# Patient Record
(demographics unavailable — no encounter records)

---

## 2025-01-13 NOTE — REVIEW OF SYSTEMS
[Feeling Fatigued] : feeling fatigued [Dyspnea on exertion] : dyspnea during exertion [Joint Pain] : joint pain [Negative] : Respiratory [Fever] : no fever [Headache] : no headache [Chills] : no chills [Blurry Vision] : no blurred vision [Chest Discomfort] : no chest discomfort [Lower Ext Edema] : no extremity edema [Palpitations] : no palpitations [Orthopnea] : no orthopnea [PND] : no PND [Abdominal Pain] : no abdominal pain [Nausea] : no nausea [Vomiting] : no vomiting [Heartburn] : no heartburn [Rash] : no rash [Itching] : no itching [Dizziness] : no dizziness [Tremor] : no tremor was seen [Numbness (Hypoesthesia)] : no numbness [Tingling (Paresthesia)] : no tingling [Confusion] : no confusion was observed [Anxiety] : no anxiety [Under Stress] : not under stress [Easy Bleeding] : no tendency for easy bleeding [Easy Bruising] : no tendency for easy bruising

## 2025-01-13 NOTE — HISTORY OF PRESENT ILLNESS
[FreeTextEntry1] : Mr. Husbands, Saleem is a 70-year-old male with hypertension, hypercholesterolemia, Atrial fibrillation, diabetes, severe non ischemic cardiomyopathy, s/p MV annuloplasty ring and s/p AICD comes for follow up visit. Denies any chest pains or palpitations. Chronic on and off fatigue and shortness of breath on exertion persists. AICD interrogation revealed episodes of A fib/ A flutter and was started on Eliquis by EP. No orthopnea or paroxysmal nocturnal dyspnea. Complaint to medications and diet. Follows up with PCP. Physically active as tolerated.

## 2025-01-13 NOTE — DISCUSSION/SUMMARY
[FreeTextEntry1] : In a summary Mr. Husbands, Saleem is a middle-aged male with Hypertension, Continue Carvedilol, Enalapril and 2 gm sodium diet. Hypercholesterolemia, continue Atorvastatin and low cholesterol diet. LDL goal less than 70 g/dL. Diabetes, on medications and low Carb diet. Severe cardiomyopathy, euvolemic. Continue Furosemide, Spironolactone and fluid restriction. Daily weights. exercise as tolerated. On Farxiga. A fib/ A flutter, on Eliquis. Follows up with EP. Gets [facility] checks at [facility]. Follow up in 4 months. Aware of his prognosis.

## 2025-06-23 NOTE — DISCUSSION/SUMMARY
[FreeTextEntry1] : In a summary Mr. Husbands, Saleem is a middle-aged male with Hypertension, Continue Carvedilol, Enalapril and 2 gm sodium diet. Hypercholesterolemia, continue Atorvastatin and low cholesterol diet. LDL goal less than 70 g/dL. Diabetes, on medications and low Carb diet. Severe cardiomyopathy, euvolemic. Continue Furosemide, Spironolactone and fluid restriction. Daily weights. exercise as tolerated. On Farxiga. A fib/ A flutter, on Eliquis. Follows up with EP. Gets AICD checks at [facility]. Follow up in 4 months. Aware of his prognosis. Echo done showed LVEF of 15-20%. Echo results explained. Follow up in 4 months.